# Patient Record
Sex: FEMALE | Race: WHITE | NOT HISPANIC OR LATINO | URBAN - METROPOLITAN AREA
[De-identification: names, ages, dates, MRNs, and addresses within clinical notes are randomized per-mention and may not be internally consistent; named-entity substitution may affect disease eponyms.]

---

## 2024-04-25 RX ORDER — APREPITANT 80 MG/1
40 CAPSULE ORAL ONCE
Refills: 0 | Status: DISCONTINUED | OUTPATIENT
Start: 2024-04-26 | End: 2024-04-26

## 2024-04-25 RX ORDER — CHLORHEXIDINE GLUCONATE 213 G/1000ML
1 SOLUTION TOPICAL ONCE
Refills: 0 | Status: DISCONTINUED | OUTPATIENT
Start: 2024-04-26 | End: 2024-04-26

## 2024-04-25 NOTE — ASU PATIENT PROFILE, ADULT - TOBACCO USE
Otolaryngologist Procedure Text (A): After obtaining clear surgical margins the patient was sent to otolaryngology for surgical repair.  The patient understands they will receive post-surgical care and follow-up from the referring physician's office. Never smoker

## 2024-04-25 NOTE — ASU PATIENT PROFILE, ADULT - NSICDXPASTSURGICALHX_GEN_ALL_CORE_FT
PAST SURGICAL HISTORY:  History of partial pancreatectomy     Presence of intrathecal pump     S/P splenectomy      PAST SURGICAL HISTORY:  H/O  section     H/O: hysterectomy     History of partial pancreatectomy     History of tonsillectomy     Presence of intrathecal pump     S/P splenectomy

## 2024-04-25 NOTE — ASU PATIENT PROFILE, ADULT - NSCAFFEINETYPE_GEN_ALL_CORE_SD
Detail Level: Detailed Medical Necessity Information: It is in your best interest to select a reason for this procedure from the list below. All of these items fulfill various CMS LCD requirements except the new and changing color options. Render Post-Care Instructions In Note?: yes Add 52 Modifier (Optional): no Post-Care Instructions: I reviewed with the patient in detail post-care instructions. Patient is to wear sunprotection, and avoid picking at any of the treated lesions. Pt may apply Vaseline to crusted or scabbing areas. Medical Necessity Clause: This procedure was medically necessary because the lesions that were treated were: Spray Paint Text: The liquid nitrogen was applied to the skin utilizing a spray paint frosting technique. Consent: The patient's consent was obtained including but not limited to risks of crusting, scabbing, blistering, scarring, darker or lighter pigmentary change, recurrence, incomplete removal and infection. tea Number Of Freeze-Thaw Cycles: 2 freeze-thaw cycles Duration Of Freeze Thaw-Cycle (Seconds): 15 Application Tool (Optional): Cry-AC

## 2024-04-25 NOTE — ASU PATIENT PROFILE, ADULT - NSICDXPASTMEDICALHX_GEN_ALL_CORE_FT
PAST MEDICAL HISTORY:  Chronic generalized abdominal pain breast to groin    Coronary disease mild    HTN (hypertension)     Hypothyroid     Prediabetes

## 2024-04-26 ENCOUNTER — OUTPATIENT (OUTPATIENT)
Dept: OUTPATIENT SERVICES | Facility: HOSPITAL | Age: 69
LOS: 1 days | Discharge: HOME | End: 2024-04-26
Payer: COMMERCIAL

## 2024-04-26 VITALS — HEART RATE: 69 BPM | RESPIRATION RATE: 15 BRPM | OXYGEN SATURATION: 95 %

## 2024-04-26 VITALS — HEIGHT: 60.98 IN | WEIGHT: 138.89 LBS

## 2024-04-26 DIAGNOSIS — Z90.710 ACQUIRED ABSENCE OF BOTH CERVIX AND UTERUS: Chronic | ICD-10-CM

## 2024-04-26 DIAGNOSIS — Z90.89 ACQUIRED ABSENCE OF OTHER ORGANS: Chronic | ICD-10-CM

## 2024-04-26 DIAGNOSIS — Z98.891 HISTORY OF UTERINE SCAR FROM PREVIOUS SURGERY: Chronic | ICD-10-CM

## 2024-04-26 DIAGNOSIS — Z97.8 PRESENCE OF OTHER SPECIFIED DEVICES: Chronic | ICD-10-CM

## 2024-04-26 DIAGNOSIS — Z90.81 ACQUIRED ABSENCE OF SPLEEN: Chronic | ICD-10-CM

## 2024-04-26 DIAGNOSIS — Z90.411 ACQUIRED PARTIAL ABSENCE OF PANCREAS: Chronic | ICD-10-CM

## 2024-04-26 PROCEDURE — 88300 SURGICAL PATH GROSS: CPT | Mod: 26

## 2024-04-26 DEVICE — KIT CATH PUMP INTRATHECAL SEGMENT W/ SUTURELESS CONN: Type: IMPLANTABLE DEVICE | Status: FUNCTIONAL

## 2024-04-26 DEVICE — SYNCROMED II INFUSION PUMP 20CC: Type: IMPLANTABLE DEVICE | Status: FUNCTIONAL

## 2024-04-26 RX ORDER — ACETAMINOPHEN 500 MG
1000 TABLET ORAL ONCE
Refills: 0 | Status: DISCONTINUED | OUTPATIENT
Start: 2024-04-26 | End: 2024-04-26

## 2024-04-26 RX ORDER — HYDROMORPHONE HYDROCHLORIDE 2 MG/ML
0.5 INJECTION INTRAMUSCULAR; INTRAVENOUS; SUBCUTANEOUS
Refills: 0 | Status: DISCONTINUED | OUTPATIENT
Start: 2024-04-26 | End: 2024-04-26

## 2024-04-26 RX ORDER — FENTANYL CITRATE 50 UG/ML
25 INJECTION INTRAVENOUS
Refills: 0 | Status: DISCONTINUED | OUTPATIENT
Start: 2024-04-26 | End: 2024-04-26

## 2024-04-26 RX ORDER — SODIUM CHLORIDE 9 MG/ML
1000 INJECTION, SOLUTION INTRAVENOUS
Refills: 0 | Status: DISCONTINUED | OUTPATIENT
Start: 2024-04-26 | End: 2024-04-26

## 2024-04-26 RX ORDER — ROSUVASTATIN CALCIUM 5 MG/1
1 TABLET ORAL
Refills: 0 | DISCHARGE

## 2024-04-26 RX ORDER — APREPITANT 80 MG/1
1 CAPSULE ORAL
Qty: 0 | Refills: 0 | DISCHARGE
Start: 2024-04-26

## 2024-04-26 RX ORDER — ONDANSETRON 8 MG/1
4 TABLET, FILM COATED ORAL ONCE
Refills: 0 | Status: DISCONTINUED | OUTPATIENT
Start: 2024-04-26 | End: 2024-04-26

## 2024-04-26 RX ORDER — OXYCODONE HYDROCHLORIDE 5 MG/1
5 TABLET ORAL ONCE
Refills: 0 | Status: DISCONTINUED | OUTPATIENT
Start: 2024-04-26 | End: 2024-04-26

## 2024-04-26 RX ORDER — CHLORTHALIDONE 50 MG
1 TABLET ORAL
Refills: 0 | DISCHARGE

## 2024-04-26 RX ORDER — OXYCODONE HYDROCHLORIDE 5 MG/1
1 TABLET ORAL
Refills: 0 | DISCHARGE

## 2024-04-26 RX ORDER — LEVOTHYROXINE SODIUM 125 MCG
1 TABLET ORAL
Refills: 0 | DISCHARGE

## 2024-04-26 RX ORDER — ACETAMINOPHEN 500 MG
2 TABLET ORAL
Qty: 0 | Refills: 0 | DISCHARGE
Start: 2024-04-26

## 2024-04-26 NOTE — PRE-ANESTHESIA EVALUATION ADULT - NSANTHOBSERVEDRD_ENT_A_CORE
Unable to assess Unable to assess No Unable to assess Unable to assess Unable to assess Unable to assess Unable to assess

## 2024-04-26 NOTE — ASU DISCHARGE PLAN (ADULT/PEDIATRIC) - NS MD DC FALL RISK RISK
For information on Fall & Injury Prevention, visit: [email or web address] OR  [email or web address] OR  https://www.cdc.gov/steadi/patient.html

## 2024-05-01 LAB — SURGICAL PATHOLOGY STUDY: SIGNIFICANT CHANGE UP

## 2024-08-02 ENCOUNTER — OFFICE (OUTPATIENT)
Dept: URBAN - METROPOLITAN AREA CLINIC 29 | Facility: CLINIC | Age: 69
Setting detail: OPHTHALMOLOGY
End: 2024-08-02
Payer: COMMERCIAL

## 2024-08-02 DIAGNOSIS — H40.033: ICD-10-CM

## 2024-08-02 DIAGNOSIS — H01.001: ICD-10-CM

## 2024-08-02 DIAGNOSIS — H43.812: ICD-10-CM

## 2024-08-02 DIAGNOSIS — H25.13: ICD-10-CM

## 2024-08-02 DIAGNOSIS — H01.004: ICD-10-CM

## 2024-08-02 PROCEDURE — 92004 COMPRE OPH EXAM NEW PT 1/>: CPT | Performed by: OPHTHALMOLOGY

## 2024-08-02 PROCEDURE — 92020 GONIOSCOPY: CPT | Performed by: OPHTHALMOLOGY

## 2024-08-02 ASSESSMENT — LID EXAM ASSESSMENTS
OS_BLEPHARITIS: LUL 1+ 2+
OD_BLEPHARITIS: RUL 1+ 2+

## 2024-08-02 ASSESSMENT — CONFRONTATIONAL VISUAL FIELD TEST (CVF)
OS_FINDINGS: FULL
OD_FINDINGS: FULL

## 2025-05-28 ENCOUNTER — OFFICE (OUTPATIENT)
Facility: LOCATION | Age: 70
Setting detail: OPHTHALMOLOGY
End: 2025-05-28
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
## 2025-05-28 PROCEDURE — NO SHOW FE NO SHOW FEE

## 2025-06-27 ENCOUNTER — RX ONLY (RX ONLY)
Age: 70
End: 2025-06-27

## 2025-06-27 ENCOUNTER — OFFICE (OUTPATIENT)
Facility: LOCATION | Age: 70
Setting detail: OPHTHALMOLOGY
End: 2025-06-27
Payer: COMMERCIAL

## 2025-06-27 DIAGNOSIS — H01.001: ICD-10-CM

## 2025-06-27 DIAGNOSIS — H35.40: ICD-10-CM

## 2025-06-27 DIAGNOSIS — H40.033: ICD-10-CM

## 2025-06-27 DIAGNOSIS — H16.223: ICD-10-CM

## 2025-06-27 DIAGNOSIS — H43.812: ICD-10-CM

## 2025-06-27 DIAGNOSIS — H01.004: ICD-10-CM

## 2025-06-27 DIAGNOSIS — H43.391: ICD-10-CM

## 2025-06-27 PROCEDURE — 92202 OPSCPY EXTND ON/MAC DRAW: CPT

## 2025-06-27 PROCEDURE — 92012 INTRM OPH EXAM EST PATIENT: CPT

## 2025-06-27 PROCEDURE — 92133 CPTRZD OPH DX IMG PST SGM ON: CPT

## 2025-06-27 ASSESSMENT — REFRACTION_MANIFEST
OS_VA1: 20/20
OD_VA1: 20/20-3
OD_AXIS: 160
OD_VA1: 20/25
OD_SPHERE: +0.75
OD_ADD: +3.00
OS_VA1: 20/25+2
OD_SPHERE: +3.00
OD_CYLINDER: +0.25
OS_CYLINDER: +0.50
OS_AXIS: 50
OS_ADD: +3.00
OS_SPHERE: +3.00
OD_CYLINDER: SPH
OS_CYLINDER: SPH
OS_SPHERE: PL

## 2025-06-27 ASSESSMENT — INCREASING TEAR LAKE - SEVERITY SCORE
OD_INC_TEARLAKE: 3+
OS_INC_TEARLAKE: 3+

## 2025-06-27 ASSESSMENT — KERATOMETRY
OS_K2POWER_DIOPTERS: 44.50
OD_K1POWER_DIOPTERS: 44.00
OD_K2POWER_DIOPTERS: 43.25
OD_AXISANGLE_DEGREES: 095
OS_K1POWER_DIOPTERS: 43.75
OS_AXISANGLE_DEGREES: 115

## 2025-06-27 ASSESSMENT — LID EXAM ASSESSMENTS
OD_BLEPHARITIS: RUL 1+ 2+
OS_BLEPHARITIS: LUL 1+ 2+

## 2025-06-27 ASSESSMENT — DECREASING TEAR LAKE - SEVERITY SCORE
OS_DEC_TEARLAKE: 3+
OD_DEC_TEARLAKE: 3+

## 2025-06-27 ASSESSMENT — REFRACTION_CURRENTRX
OD_CYLINDER: SPH
OS_OVR_VA: 20/
OD_AXIS: 180
OS_ADD: +2.75
OD_SPHERE: +0.50
OD_OVR_VA: 20/
OS_AXIS: 180
OS_SPHERE: +0.25
OD_ADD: +2.00
OS_CYLINDER: SPH

## 2025-06-27 ASSESSMENT — TEAR BREAK UP TIME (TBUT)
OD_TBUT: 3+
OS_TBUT: 3+

## 2025-06-27 ASSESSMENT — CONFRONTATIONAL VISUAL FIELD TEST (CVF)
OS_FINDINGS: FULL
OD_FINDINGS: FULL

## 2025-06-27 ASSESSMENT — SUPERFICIAL PUNCTATE KERATITIS (SPK)
OD_SPK: 3+
OS_SPK: 3+

## 2025-06-27 ASSESSMENT — VISUAL ACUITY
OS_BCVA: 20/25-2
OD_BCVA: 20/20-2

## 2025-06-27 ASSESSMENT — REFRACTION_AUTOREFRACTION
OS_SPHERE: UNABLE
OD_SPHERE: UNABLE

## 2025-06-27 ASSESSMENT — TONOMETRY
OS_IOP_MMHG: 16
OD_IOP_MMHG: 14

## 2025-07-01 ENCOUNTER — OFFICE (OUTPATIENT)
Facility: LOCATION | Age: 70
Setting detail: OPHTHALMOLOGY
End: 2025-07-01
Payer: COMMERCIAL

## 2025-07-01 DIAGNOSIS — H16.223: ICD-10-CM

## 2025-07-01 DIAGNOSIS — H25.13: ICD-10-CM

## 2025-07-01 DIAGNOSIS — H01.001: ICD-10-CM

## 2025-07-01 DIAGNOSIS — H40.033: ICD-10-CM

## 2025-07-01 DIAGNOSIS — H01.004: ICD-10-CM

## 2025-07-01 PROBLEM — H43.391 VITREOUS FLOATERS; RIGHT EYE: Status: ACTIVE | Noted: 2025-06-27

## 2025-07-01 PROBLEM — H35.40 PERIPAPILLARY ATROPHY ; LEFT EYE: Status: ACTIVE | Noted: 2025-06-27

## 2025-07-01 PROCEDURE — 92020 GONIOSCOPY: CPT | Performed by: OPHTHALMOLOGY

## 2025-07-01 PROCEDURE — 99213 OFFICE O/P EST LOW 20 MIN: CPT | Performed by: OPHTHALMOLOGY

## 2025-07-01 ASSESSMENT — REFRACTION_MANIFEST
OS_AXIS: 50
OS_CYLINDER: +0.50
OS_VA1: 20/25+2
OD_ADD: +3.00
OS_ADD: +3.00
OD_SPHERE: +3.00
OD_AXIS: 160
OS_SPHERE: PL
OD_CYLINDER: SPH
OD_VA1: 20/25
OD_CYLINDER: +0.25
OS_CYLINDER: SPH
OS_VA1: 20/20
OS_SPHERE: +3.00
OD_SPHERE: +0.75
OD_VA1: 20/20-3

## 2025-07-01 ASSESSMENT — KERATOMETRY
OD_K2POWER_DIOPTERS: 43.25
OD_AXISANGLE_DEGREES: 095
OS_K2POWER_DIOPTERS: 44.50
OD_K1POWER_DIOPTERS: 44.00
OS_K1POWER_DIOPTERS: 43.75
OS_AXISANGLE_DEGREES: 115

## 2025-07-01 ASSESSMENT — CONFRONTATIONAL VISUAL FIELD TEST (CVF)
OD_FINDINGS: FULL
OS_FINDINGS: FULL

## 2025-07-01 ASSESSMENT — REFRACTION_CURRENTRX
OD_CYLINDER: SPH
OS_ADD: +2.75
OS_SPHERE: +0.25
OD_ADD: +2.00
OS_OVR_VA: 20/
OD_SPHERE: +0.50
OS_AXIS: 180
OD_OVR_VA: 20/
OD_AXIS: 180
OS_CYLINDER: SPH

## 2025-07-01 ASSESSMENT — DECREASING TEAR LAKE - SEVERITY SCORE
OS_DEC_TEARLAKE: 3+
OD_DEC_TEARLAKE: 3+

## 2025-07-01 ASSESSMENT — SUPERFICIAL PUNCTATE KERATITIS (SPK)
OS_SPK: 3+
OD_SPK: 3+

## 2025-07-01 ASSESSMENT — LID EXAM ASSESSMENTS
OS_BLEPHARITIS: LUL 1+ 2+
OD_BLEPHARITIS: RUL 1+ 2+

## 2025-07-01 ASSESSMENT — INCREASING TEAR LAKE - SEVERITY SCORE
OD_INC_TEARLAKE: 3+
OS_INC_TEARLAKE: 3+

## 2025-07-01 ASSESSMENT — REFRACTION_AUTOREFRACTION
OS_SPHERE: UNABLE
OD_SPHERE: UNABLE

## 2025-07-01 ASSESSMENT — TONOMETRY
OD_IOP_MMHG: 11
OS_IOP_MMHG: 11

## 2025-07-01 ASSESSMENT — TEAR BREAK UP TIME (TBUT)
OD_TBUT: 3+
OS_TBUT: 3+

## 2025-07-01 ASSESSMENT — VISUAL ACUITY
OS_BCVA: 20/30
OD_BCVA: 20/25

## (undated) DEVICE — DRSG AQUACEL 3.5 X 10"

## (undated) DEVICE — DRAPE C ARM 41X74"

## (undated) DEVICE — SUT VICRYL 3-0 18" SH UNDYED (POP-OFF)

## (undated) DEVICE — PACK GENERAL MINOR

## (undated) DEVICE — DRAPE TOP SHEET 53" X 101"

## (undated) DEVICE — GLV 6.5 PROTEXIS (WHITE)

## (undated) DEVICE — SUT ETHIBOND 2-0 30" RB-1

## (undated) DEVICE — DRAPE ULTRASOUND PROBE COVER NEOGAURD LATEX FREE

## (undated) DEVICE — DRSG AQUACEL 3.5 X 6"

## (undated) DEVICE — ELCTR PLASMA BLADE X 3.0S LIGHT

## (undated) DEVICE — DRAPE C ARM C-ARMOUR

## (undated) DEVICE — GLV 6.5 PROTEXIS (BLUE)

## (undated) DEVICE — PREP CHLORAPREP HI-LITE ORANGE 26ML

## (undated) DEVICE — DRSG DERMABOND 0.7ML

## (undated) DEVICE — SUT VICRYL 0 27" UR-5

## (undated) DEVICE — SUT VICRYL 3-0 18" SH (POP-OFF)

## (undated) DEVICE — SUT ETHIBOND EXCEL 2-0 30"

## (undated) DEVICE — DRAPE IOBAN 23" X 23"

## (undated) DEVICE — SUT MONOCRYL 3-0 18" PS-1

## (undated) DEVICE — SUT VICRYL 2-0 18" CT-1 UNDYED (POP-OFF)

## (undated) DEVICE — SPECIMEN CONTAINER 100ML

## (undated) DEVICE — SYR LUER LOK 10CC

## (undated) DEVICE — MARKING PEN W RULER

## (undated) DEVICE — SUT SILK 2-0 30" PSL

## (undated) DEVICE — SYR LOR GLASS LUER SLIP 5CC

## (undated) DEVICE — DRSG TEGADERM 4X10"

## (undated) DEVICE — SUT ETHIBOND 2-0 30" CT-1

## (undated) DEVICE — SYR LUER LOK 3CC

## (undated) DEVICE — DRAPE SPLIT SHEET 77" X 108"

## (undated) DEVICE — REFIL TY PMP SYNCHROMED

## (undated) DEVICE — COVER PROBE INTRAOP ISOSILK 244X13CM

## (undated) DEVICE — TRAY ANES SPINAL EPI COMBO W DRUGS

## (undated) DEVICE — VENODYNE/SCD SLEEVE CALF MEDIUM